# Patient Record
Sex: FEMALE | URBAN - METROPOLITAN AREA
[De-identification: names, ages, dates, MRNs, and addresses within clinical notes are randomized per-mention and may not be internally consistent; named-entity substitution may affect disease eponyms.]

---

## 2020-02-05 ENCOUNTER — APPOINTMENT (RX ONLY)
Dept: URBAN - METROPOLITAN AREA CLINIC 45 | Facility: CLINIC | Age: 35
Setting detail: DERMATOLOGY
End: 2020-02-05

## 2020-02-05 DIAGNOSIS — L85.3 XEROSIS CUTIS: ICD-10-CM

## 2020-02-05 PROBLEM — D23.5 OTHER BENIGN NEOPLASM OF SKIN OF TRUNK: Status: ACTIVE | Noted: 2020-02-05

## 2020-02-05 PROBLEM — J45.909 UNSPECIFIED ASTHMA, UNCOMPLICATED: Status: ACTIVE | Noted: 2020-02-05

## 2020-02-05 PROCEDURE — 99203 OFFICE O/P NEW LOW 30 MIN: CPT

## 2020-02-05 PROCEDURE — ? COUNSELING

## 2020-02-05 PROCEDURE — ? TREATMENT REGIMEN

## 2020-02-05 NOTE — PROCEDURE: TREATMENT REGIMEN
Plan: Location: suprapubic region \\nTreatment: future excision \\n\\nPatient has a nodule that she finds very bothersome. \\nShe states that she believes this to be an ingrown hair. \\nThis nodule has been present for about three and a half months and is not improving. \\nShe states that she had tried \"messing with it\" but she is limited to what she is able to do as she is nine months pregnant. \\n\\nDiscussed with patient that this appears to be a dermatofibroma. \\nDiscussed with her that these are benign growths that typically happen after trauma. \\nDiscussed with patient that if she finds this bothersome that we can remove it via excision after she has the baby. \\n\\nToday, I will attempt to excise this nodule using an 11 blade and cotton tip applicators.  \\n\\nF/u PRN \\n\\n
Detail Level: Zone